# Patient Record
Sex: MALE | Race: WHITE | NOT HISPANIC OR LATINO | Employment: FULL TIME | URBAN - METROPOLITAN AREA
[De-identification: names, ages, dates, MRNs, and addresses within clinical notes are randomized per-mention and may not be internally consistent; named-entity substitution may affect disease eponyms.]

---

## 2023-10-24 ENCOUNTER — OFFICE VISIT (OUTPATIENT)
Dept: AUDIOLOGY | Facility: CLINIC | Age: 53
End: 2023-10-24
Payer: COMMERCIAL

## 2023-10-24 DIAGNOSIS — H90.A32 MIXED CONDUCTIVE AND SENSORINEURAL HEARING LOSS OF LEFT EAR WITH RESTRICTED HEARING OF RIGHT EAR: Primary | ICD-10-CM

## 2023-10-24 DIAGNOSIS — H90.A21 SENSORINEURAL HEARING LOSS (SNHL) OF RIGHT EAR WITH RESTRICTED HEARING OF LEFT EAR: ICD-10-CM

## 2023-10-24 PROCEDURE — 92557 COMPREHENSIVE HEARING TEST: CPT | Performed by: AUDIOLOGIST

## 2023-10-24 PROCEDURE — 99207 PR NO CHARGE LOS: CPT | Performed by: AUDIOLOGIST

## 2023-10-24 PROCEDURE — 92591 PR HEARING AID EXAM BINAURAL: CPT | Performed by: AUDIOLOGIST

## 2023-10-24 NOTE — PROGRESS NOTES
AUDIOLOGY REPORT    SUBJECTIVE:  Matthew Flores is a 53 year old male who was seen in the Audiology Clinic at the [facility] for audiologic evaluation, self-referred. The patient reports known hearing loss, bilaterally. History of occupational noise exposure and history of left ear surgery. Cholesteatoma removed 8 years ago and reconstruction surgery February 2023. Dr. Salazar cleared for hearing aids, sending for records. He noted difficulty hearing conversation, localizing sound, and following conversation in background nosie. They were accompanied today by their wife.    OBJECTIVE:  Otoscopic exam indicates ears are clear of cerumen bilaterally     Pure Tone Thresholds assessed using conventional audiometry with good  reliability from 250-8000 Hz bilaterally using insert earphones     RIGHT:  normal sloping to moderate and rising to mild  sensorineural hearing loss    LEFT:    mild-moderate sloping to profound mixed hearing loss    Tympanogram:    RIGHT: did not test    LEFT:   did not test surgical ear    Speech Reception Threshold:    RIGHT: 25 dB HL    LEFT:   45 dB HL    Word Recognition Score:     RIGHT: 100% at 75 dB HL using NU-6 recorded word list.    LEFT:   100% at 85 dB HL using NU-6 recorded word list.    Patient is a hearing aid candidate. Patient would like to move forward with a hearing aid evaluation today. Therefore, the patient was presented with different options for amplification to help aid in communication. Discussed styles, levels of technology and monaural vs. binaural fitting.     The hearing aid(s) mutually chosen were:  Binaural: Phonak Audeo Life Lumity 50 R  COLOR: Velvet Black  BATTERY SIZE: rechargeable  EARMOLD/TIPS: small closed right / medium double left  CANAL/ LENGTH: 2    ASSESSMENT:     ICD-10-CM    1. Mixed conductive and sensorineural hearing loss of left ear with restricted hearing of right ear  H90.A32 Cmprhn Audiometry Thrshld Eval & Speech  Recog (89481)     Hearing Aid Exam, Binaural (94012)      2. Sensorineural hearing loss (SNHL) of right ear with restricted hearing of left ear  H90.A21 Cmprhn Audiometry Thrshld Eval & Speech Recog (39817)     Hearing Aid Exam, Binaural ([id number])        Reviewed purchase information and warranty information with patient. The 45 day trial period was explained to patient. The patient was given a copy of the [place] [employer] consumer brochure on purchasing hearing instruments. Patient risk factors have been provided to the patient in writing prior to the sale of the hearing aid per FDA regulation. The risk factors are also available in the User Instructional Booklet to be presented on the day of the hearing aid fitting. Hearing aid(s) ordered. Hearing aid evaluation completed.Today s results were discussed with the patient in detail.     PLAN:    Patient was counseled regarding hearing loss and impact on communication. Matthew is scheduled to return in 2-3 weeks for a hearing aid fitting and programming. Purchase agreement will be completed on that date. Please call this clinic with questions regarding these results or recommendations.        Ruby Bonner.  [place] Licensed Audiologist #[id number]

## 2023-11-21 ENCOUNTER — OFFICE VISIT (OUTPATIENT)
Dept: AUDIOLOGY | Facility: CLINIC | Age: 53
End: 2023-11-21
Payer: COMMERCIAL

## 2023-11-21 DIAGNOSIS — H90.A32 MIXED CONDUCTIVE AND SENSORINEURAL HEARING LOSS OF LEFT EAR WITH RESTRICTED HEARING OF RIGHT EAR: Primary | ICD-10-CM

## 2023-11-21 DIAGNOSIS — H90.A21 SENSORINEURAL HEARING LOSS (SNHL) OF RIGHT EAR WITH RESTRICTED HEARING OF LEFT EAR: ICD-10-CM

## 2023-11-21 PROCEDURE — 92593 PR HEARING AID CHECK, BINAURAL: CPT | Performed by: AUDIOLOGIST

## 2023-11-21 PROCEDURE — V5160 DISPENSING FEE BINAURAL: HCPCS | Performed by: AUDIOLOGIST

## 2023-11-21 PROCEDURE — V5011 HEARING AID FITTING/CHECKING: HCPCS | Mod: RT | Performed by: AUDIOLOGIST

## 2023-11-21 PROCEDURE — V5261 HEARING AID, DIGIT, BIN, BTE: HCPCS | Performed by: AUDIOLOGIST

## 2023-11-21 PROCEDURE — V5020 CONFORMITY EVALUATION: HCPCS | Mod: RT | Performed by: AUDIOLOGIST

## 2023-11-21 NOTE — PROGRESS NOTES
AUDIOLOGY REPORT    SUBJECTIVE:   Matthew Flores, a 53 year old male, was seen in the Audiology Clinic at [facility] today for a Binaural hearing aid fitting. Previous evaluation 10/24/2023 showed normal sloping to moderate and rising to mild  sensorineural hearing loss and mild-moderate sloping to profound mixed hearing loss. The patient reports known hearing loss, bilaterally. History of occupational noise exposure and history of left ear surgery. Cholesteatoma removed 8 years ago and reconstruction surgery February 2023. Dr. Salazar cleared for hearing aids, sending for records. He noted difficulty hearing conversation, localizing sound, and following conversation in background nosie.     OBJECTIVE:    Prior to fitting, a hearing aid check was performed to ensure device functionality. The hearing aid conformity evaluation was completed.The hearing aids were placed and they provided a good fit. Real-ear-probe-microphone measurements were completed on the [employer] system and were a good match to NAL-NL2 target with soft sounds audible, moderate sounds comfortable, and loud sounds below discomfort. UCLs are verified through maximum power output measures and demonstrate appropriate limiting of loud inputs. Mr. Flores was oriented to proper hearing aid use, care, cleaning (no water, dry brush), batteries (size rechargeable, insertion/removal, toxicity, low-battery signal), aid insertion/removal, user booklet, warranty information, storage cases, and other hearing aid details. The patient confirmed understanding of hearing aid use and care, and showed proper insertion of hearing aid and batteries while in the office today. Mr. Flores reported good volume and sound quality today.    Paired to his iPhone, and paired to Revl gerry.    EAR(S) FIT: Binaural  HEARING AID MAKE: Right: Phonak; Left: Phonak    HEARING AID MODEL #: Right: Life L50-RL; Left: Life L50-RL  HEARING AID STYLE: Right: BTE/NEGRITA;  Left: BTE/NEGRITA  DOME SIZE: Right:  8 mm tulip; Left::  Medium Vented Sleeve   LENGTH: Right:  M  4.0 2R; Left:  M  4.0 2L  SERIAL NUMBERS: Right: [id number]; Left: [id number]  WARRANTY END DATE: Right: 1/21/2027; Left:: 1/21/2027    (The patient Does Not Require a signed a waiver that is on file agreeing to the upgrade charge, per their insurance contract. )        ASSESSMENT:   Binaural Phonak Life L50-RL hearing aid fitting completed today. Verification measures were performed. The 45 day trial period was explained to patient, and they expressed understanding. Mr. Flores signed the Hearing Aid Purchase Agreement and was given a copy, as well as details on his hearing aids. Patient was counseled that exact out of pocket amounts cannot be determined for hearing aid claims being sent to insurance. Any insurance coverage information presented to the patient is an estimate only, and is not a guarantee of payment. Patient has been advised to check with their own insurance.    PLAN: Mr. Flores will return for follow-up in 2-3 weeks for a hearing aid review appointment. Please call this clinic with questions regarding today s appointment.    Ruby Bonner.  [place] Licensed Audiologist #[id number]

## 2023-11-21 NOTE — PATIENT INSTRUCTIONS

## 2023-12-05 ENCOUNTER — OFFICE VISIT (OUTPATIENT)
Dept: AUDIOLOGY | Facility: CLINIC | Age: 53
End: 2023-12-05
Payer: COMMERCIAL

## 2023-12-05 DIAGNOSIS — H90.A21 SENSORINEURAL HEARING LOSS (SNHL) OF RIGHT EAR WITH RESTRICTED HEARING OF LEFT EAR: ICD-10-CM

## 2023-12-05 DIAGNOSIS — H90.A32 MIXED CONDUCTIVE AND SENSORINEURAL HEARING LOSS OF LEFT EAR WITH RESTRICTED HEARING OF RIGHT EAR: Primary | ICD-10-CM

## 2023-12-05 PROCEDURE — V5010 ASSESSMENT FOR HEARING AID: HCPCS | Performed by: AUDIOLOGIST

## 2023-12-05 PROCEDURE — 99207 PR NO CHARGE LOS: CPT | Performed by: AUDIOLOGIST

## 2023-12-05 NOTE — PROGRESS NOTES
AUDIOLOGY REPORT     SUBJECTIVE:   Matthew Flores, a 53 year old male, was seen in the Audiology Clinic at [facility] today for follow-up after Phonak [employer].comeo Life L50-RL hearing aid fitting. Hearing aids fit 11/21/2023.    Previous evaluation 10/24/2023 showed normal sloping to moderate and rising to mild  sensorineural hearing loss and mild-moderate sloping to profound mixed hearing loss. The patient has history of occupational noise exposure and history of left ear surgery. Cholesteatoma removed 8 years ago and reconstruction surgery February 2023. Prior to hearing aid fitting he noted difficulty hearing conversation, localizing sound, and following conversation in background noise.    He noted improved ability to hear conversation in quiet and groups. In situations likes sporting events and large family gatherings (Thanksgiving) he turns the volume down 2 increments. Otherwise he is using devices at NAL-NL2 prescriptive targets as programmed at his hearing aid fitting.    OBJECTIVE:   The International Outcome Inventory-Hearing Aids (IOI-HA) was administered today after the appointment by Alise.    Based on patient report, the following changes were made; no programming changes were made at today's visit..    Reviewed 45 day trial period, care, cleaning (no water, dry brush), batteries (size rechargeable) insertion/removal, toxicity, low-battery signal), aid insertion/removal, volume adjustment (if applicable), wax filter replacement,  replacement, user booklet, warranty information, storage cases, and other hearing aid details.     No charge visit today (in warranty hearing aid check).    ASSESSMENT:   A follow-up appointment for hearing aid fitting was completed today. IOI-HA administered today. Changes to hearing aid was completed as outlined above.     PLAN:  Matthew will return for follow-up in 4-6 months sooner if there are concerns or problems. Please call this clinic with  any questions regarding today s appointment.    [place] Bonner Licensed Audiologist #[id number]

## 2023-12-31 ENCOUNTER — HEALTH MAINTENANCE LETTER (OUTPATIENT)
Age: 53
End: 2023-12-31

## 2024-04-12 ENCOUNTER — OFFICE VISIT (OUTPATIENT)
Dept: AUDIOLOGY | Facility: CLINIC | Age: 54
End: 2024-04-12
Payer: COMMERCIAL

## 2024-04-12 DIAGNOSIS — H90.A32 MIXED CONDUCTIVE AND SENSORINEURAL HEARING LOSS OF LEFT EAR WITH RESTRICTED HEARING OF RIGHT EAR: Primary | ICD-10-CM

## 2024-04-12 DIAGNOSIS — H90.A21 SENSORINEURAL HEARING LOSS (SNHL) OF RIGHT EAR WITH RESTRICTED HEARING OF LEFT EAR: ICD-10-CM

## 2024-04-12 PROCEDURE — V5010 ASSESSMENT FOR HEARING AID: HCPCS | Performed by: AUDIOLOGIST

## 2024-04-12 NOTE — PROGRESS NOTES
AUDIOLOGY REPORT     SUBJECTIVE:   Matthew Flores, a 54 year old male, was seen in the Audiology Clinic at [facility] today for hearing aid maintenance, he was fit with Phonak Audeo Life L50-RL hearing aids 11/21/2023.     Previous evaluation 10/24/2023 showed normal sloping to moderate and rising to mild  sensorineural hearing loss and mild-moderate sloping to profound mixed hearing loss. The patient has history of occupational noise exposure and history of left ear surgery. Cholesteatoma removed 8 years ago and reconstruction surgery February 2023. Prior to hearing aid fitting he noted difficulty hearing conversation, localizing sound, and following conversation in background noise.     He noted improved ability to hear conversation in quiet and groups. In situations likes sporting events and large family gatherings (Thanksgiving) he turns the volume down 2 increments. Otherwise he is using devices at NAL-NL2 prescriptive targets as programmed at his hearing aid fitting.     OBJECTIVE:   Based on patient report, the following changes were made; no programming changes were made at today's visit..     Hearing aid maintenance was completed to replace wax filters, replaced small tulip dome right and medium vented sleeve left. Provided replacement wax filters, domes, and receivers (5.0 2 L M amd 5.0 2 R S).     No charge visit today (in warranty hearing aid check).     ASSESSMENT:   A follow-up appointment for hearing aid fitting was completed today. Changes to hearing aid was completed as outlined above.      PLAN:  Matthew will return as needed for maintenance, supplies, and programming. Recommended hearing aid test before 10/2025 sooner if he notes changes. Hearing aids are in warranty until 1/2027, dicussed sending in to have batteries replaced in 12/2026. Please call this clinic with any questions regarding today s appointment.     Ruby Bonner.  [place] Licensed Audiologist  #[id number]

## 2025-01-19 ENCOUNTER — HEALTH MAINTENANCE LETTER (OUTPATIENT)
Age: 55
End: 2025-01-19